# Patient Record
Sex: MALE | Race: WHITE | Employment: UNEMPLOYED | URBAN - METROPOLITAN AREA
[De-identification: names, ages, dates, MRNs, and addresses within clinical notes are randomized per-mention and may not be internally consistent; named-entity substitution may affect disease eponyms.]

---

## 2020-12-28 ENCOUNTER — ORDER TRANSCRIPTION (OUTPATIENT)
Dept: PHYSICAL THERAPY | Facility: HOSPITAL | Age: 25
End: 2020-12-28

## 2020-12-28 DIAGNOSIS — M24.10 ACQUIRED DEFECT OF ARTICULAR CARTILAGE: Primary | ICD-10-CM

## 2020-12-28 DIAGNOSIS — Z98.890 S/P LEFT KNEE ARTHROSCOPY: ICD-10-CM

## 2020-12-28 DIAGNOSIS — S83.241A ACUTE MEDIAL MENISCUS TEAR OF RIGHT KNEE: ICD-10-CM

## 2021-01-04 ENCOUNTER — TELEPHONE (OUTPATIENT)
Dept: PHYSICAL THERAPY | Facility: HOSPITAL | Age: 26
End: 2021-01-04

## 2021-01-06 ENCOUNTER — OFFICE VISIT (OUTPATIENT)
Dept: PHYSICAL THERAPY | Age: 26
End: 2021-01-06
Attending: PHYSICIAN ASSISTANT
Payer: COMMERCIAL

## 2021-01-06 DIAGNOSIS — Z98.890 S/P LEFT KNEE ARTHROSCOPY: ICD-10-CM

## 2021-01-06 DIAGNOSIS — M24.10 ACQUIRED DEFECT OF ARTICULAR CARTILAGE: ICD-10-CM

## 2021-01-06 DIAGNOSIS — S83.241A ACUTE MEDIAL MENISCUS TEAR OF RIGHT KNEE: ICD-10-CM

## 2021-01-06 PROCEDURE — 97161 PT EVAL LOW COMPLEX 20 MIN: CPT

## 2021-01-06 PROCEDURE — 97110 THERAPEUTIC EXERCISES: CPT

## 2021-01-06 NOTE — PROGRESS NOTES
INITIAL EVALUATION:   Referring Physician: Minh Mejia MD;  Sonido Austin PA-C  Diagnosis:   -Acquired defect of articular cartialge  -Tear of medial mensicus  -S/P left knee arthroscopy with chondroplasty and cartilage biopsy; osteochondral crutches; NWB L LE  Neurological exam:    -Sensation intact  AROM/overpressure:   -Left knee 0-80  Palpation:  -Minimal warmth; calf non-tender  Passive/segmental/accessory movement testing:    -Deferred  Muscle strength:   -Quad:  Generates good isometric as assessed via [facility] - ADLs  (12 weeks)  1. Patient able to attend to progression in closed chair exercise such as squatting/lunging tasks within functional context without appropriate perform and without pain complaint  2.   Patient able to demonstrate ho

## 2021-01-08 ENCOUNTER — APPOINTMENT (OUTPATIENT)
Dept: PHYSICAL THERAPY | Age: 26
End: 2021-01-08
Attending: PHYSICIAN ASSISTANT
Payer: COMMERCIAL

## 2021-01-13 ENCOUNTER — OFFICE VISIT (OUTPATIENT)
Dept: PHYSICAL THERAPY | Age: 26
End: 2021-01-13
Attending: PHYSICIAN ASSISTANT
Payer: COMMERCIAL

## 2021-01-13 PROCEDURE — 97110 THERAPEUTIC EXERCISES: CPT

## 2021-01-13 PROCEDURE — 97140 MANUAL THERAPY 1/> REGIONS: CPT

## 2021-01-13 NOTE — PROGRESS NOTES
Dx:       -Acquired defect of articular cartialge  -Tear of medial mensicus  -S/P left knee arthroscopy with chondroplasty and cartilage biopsy; osteochondral allograft with high tibial osteotomy  -Surgical date: 12/23/2020  (NWB - 6 weeks)    -Referring P initiate closed chain exercises  4. Patient without minimal limitation with ADLs as assessed via [facility] - ADLs  (12 weeks)  1.   Patient able to attend to progression in closed chair exercise such as squatting/lunging tasks within functional context without

## 2021-01-15 ENCOUNTER — APPOINTMENT (OUTPATIENT)
Dept: PHYSICAL THERAPY | Age: 26
End: 2021-01-15
Attending: PHYSICIAN ASSISTANT
Payer: COMMERCIAL

## 2021-01-20 ENCOUNTER — OFFICE VISIT (OUTPATIENT)
Dept: PHYSICAL THERAPY | Age: 26
End: 2021-01-20
Attending: PHYSICIAN ASSISTANT
Payer: COMMERCIAL

## 2021-01-20 PROCEDURE — 97110 THERAPEUTIC EXERCISES: CPT

## 2021-01-20 PROCEDURE — 97014 ELECTRIC STIMULATION THERAPY: CPT

## 2021-01-20 NOTE — PROGRESS NOTES
Dx:       -Acquired defect of articular cartialge  -Tear of medial mensicus  -S/P left knee arthroscopy with chondroplasty and cartilage biopsy; osteochondral allograft with high tibial osteotomy  -Surgical date: 12/23/2020  (NWB - 6 weeks)    -Referring P Patient without minimal limitation with ADLs as assessed via KOS[facility] - ADLs  (12 weeks)  1.   Patient able to attend to progression in closed chair exercise such as squatting/lunging tasks within functional context without appropriate perform and without pain Charges:    Therapeutic excercise x 2 (30 min)  Electrical stimulation x 1 (15 min)   Total Timed Treatment: 45 min    Total Treatment Time: 45 min

## 2021-01-22 ENCOUNTER — APPOINTMENT (OUTPATIENT)
Dept: PHYSICAL THERAPY | Age: 26
End: 2021-01-22
Attending: PHYSICIAN ASSISTANT
Payer: COMMERCIAL

## 2021-01-27 ENCOUNTER — APPOINTMENT (OUTPATIENT)
Dept: PHYSICAL THERAPY | Age: 26
End: 2021-01-27
Attending: PHYSICIAN ASSISTANT
Payer: COMMERCIAL

## 2021-01-28 ENCOUNTER — OFFICE VISIT (OUTPATIENT)
Dept: PHYSICAL THERAPY | Age: 26
End: 2021-01-28
Attending: PHYSICIAN ASSISTANT
Payer: COMMERCIAL

## 2021-01-28 PROCEDURE — 97110 THERAPEUTIC EXERCISES: CPT

## 2021-01-28 PROCEDURE — 97112 NEUROMUSCULAR REEDUCATION: CPT

## 2021-01-29 ENCOUNTER — APPOINTMENT (OUTPATIENT)
Dept: PHYSICAL THERAPY | Age: 26
End: 2021-01-29
Attending: PHYSICIAN ASSISTANT
Payer: COMMERCIAL

## 2021-02-01 NOTE — PROGRESS NOTES
Dx:       -Acquired defect of articular cartialge  -Tear of medial mensicus  -S/P left knee arthroscopy with chondroplasty and cartilage biopsy; osteochondral allograft with high tibial osteotomy  -Surgical date: 12/23/2020  (NWB - 6 weeks)    -Referring P press with weight bearing tolerance, proprioceptive tasks, and gait training activity as we progress weight bearing and move away for assistive device. Remain @ 1 x per week for additional two weeks with increase frequency two times per week at six weeks. support: 15 reps x 3 sets each LE                              Charges:    Therapeutic excercise x 2 (30 min)  Neuromuscular re-education x  1 (15 min)   Total Timed Treatment: 45 min    Total Treatment Time: 45 min

## 2021-02-02 ENCOUNTER — OFFICE VISIT (OUTPATIENT)
Dept: PHYSICAL THERAPY | Age: 26
End: 2021-02-02
Attending: PHYSICIAN ASSISTANT
Payer: COMMERCIAL

## 2021-02-02 PROCEDURE — 97112 NEUROMUSCULAR REEDUCATION: CPT

## 2021-02-02 PROCEDURE — 97110 THERAPEUTIC EXERCISES: CPT

## 2021-02-02 NOTE — PROGRESS NOTES
Dx:       -Acquired defect of articular cartialge  -Tear of medial mensicus  -S/P left knee arthroscopy with chondroplasty and cartilage biopsy; osteochondral allograft with high tibial osteotomy  -Surgical date: 12/23/2020  (NWB - 6 weeks)    -Referring P bearing with minimal pain complaint of gait deviation  2. Patient to demonstrate active motion left knee comparable to non-involved side  3. Patient able to initiate closed chain exercises  4.   Patient without minimal limitation with ADLs as assessed via minutes;discussed home program progression and upper body exercise as tolerated within sitting positions. -Stationary bike x 15 minutes level 1.0 discussed resistance at home as tolerated within comfort and can work within 80 rpm aiming for moderate intens

## 2021-02-03 ENCOUNTER — APPOINTMENT (OUTPATIENT)
Dept: PHYSICAL THERAPY | Age: 26
End: 2021-02-03
Attending: PHYSICIAN ASSISTANT
Payer: COMMERCIAL

## 2021-02-04 ENCOUNTER — TELEPHONE (OUTPATIENT)
Dept: PHYSICAL THERAPY | Age: 26
End: 2021-02-04

## 2021-02-04 NOTE — TELEPHONE ENCOUNTER
Micheljessika Katelynn did not show for 3:00 appointment today; phoned [facility] Staff to check status. Spoke with [facility] Staff and he indicated he had cancelled appointment via my chart as he went out of town.   Asked patient to phone scheduled appointments week of 02/09/2020 [place]

## 2021-02-05 ENCOUNTER — TELEPHONE (OUTPATIENT)
Dept: PHYSICAL THERAPY | Facility: HOSPITAL | Age: 26
End: 2021-02-05

## 2021-02-05 ENCOUNTER — APPOINTMENT (OUTPATIENT)
Dept: PHYSICAL THERAPY | Age: 26
End: 2021-02-05
Attending: PHYSICIAN ASSISTANT
Payer: COMMERCIAL

## 2021-02-10 ENCOUNTER — APPOINTMENT (OUTPATIENT)
Dept: PHYSICAL THERAPY | Age: 26
End: 2021-02-10
Attending: PHYSICIAN ASSISTANT
Payer: COMMERCIAL

## 2021-02-12 ENCOUNTER — APPOINTMENT (OUTPATIENT)
Dept: PHYSICAL THERAPY | Age: 26
End: 2021-02-12
Attending: PHYSICIAN ASSISTANT
Payer: COMMERCIAL

## 2021-02-17 ENCOUNTER — APPOINTMENT (OUTPATIENT)
Dept: PHYSICAL THERAPY | Age: 26
End: 2021-02-17
Attending: PHYSICIAN ASSISTANT
Payer: COMMERCIAL

## 2021-02-19 ENCOUNTER — APPOINTMENT (OUTPATIENT)
Dept: PHYSICAL THERAPY | Age: 26
End: 2021-02-19
Attending: PHYSICIAN ASSISTANT
Payer: COMMERCIAL

## 2021-02-24 ENCOUNTER — APPOINTMENT (OUTPATIENT)
Dept: PHYSICAL THERAPY | Age: 26
End: 2021-02-24
Attending: PHYSICIAN ASSISTANT
Payer: COMMERCIAL

## 2021-02-26 ENCOUNTER — APPOINTMENT (OUTPATIENT)
Dept: PHYSICAL THERAPY | Age: 26
End: 2021-02-26
Attending: PHYSICIAN ASSISTANT
Payer: COMMERCIAL

## 2021-03-03 ENCOUNTER — TELEPHONE (OUTPATIENT)
Dept: PHYSICAL THERAPY | Facility: HOSPITAL | Age: 26
End: 2021-03-03

## 2021-03-03 ENCOUNTER — APPOINTMENT (OUTPATIENT)
Dept: PHYSICAL THERAPY | Age: 26
End: 2021-03-03
Attending: PHYSICIAN ASSISTANT
Payer: COMMERCIAL

## 2021-03-05 ENCOUNTER — APPOINTMENT (OUTPATIENT)
Dept: PHYSICAL THERAPY | Age: 26
End: 2021-03-05
Attending: PHYSICIAN ASSISTANT
Payer: COMMERCIAL

## (undated) NOTE — LETTER
Patient Name: Cheryl Hamilton  YOB: 1995          MRN number:  [id number]  Date:  1/6/2021  Referring:  Lisa Whitt PA-C         INITIAL EVALUATION:    Referring Physician: Celia Sims MD;  Dimitri Zendejas PA-C  Diagnosis:   -Acqu -Ace wrap/knee immobilizer locked @ 0 degrees of extension  -Ecchymosis L LE; minimal redness; incision without drainage; steristrips; single suture medial portal incision; mild swelling  -Gait B crutches; NWB L LE  Neurological exam:    -Sensation intact 1.  Patient able to perform community level ambulation full weight bearing with minimal pain complaint of gait deviation  2. Patient to demonstrate active motion left knee comparable to non-involved side  3.   Patient able to initiate closed chain exercise